# Patient Record
Sex: MALE | Race: WHITE | Employment: STUDENT | URBAN - METROPOLITAN AREA
[De-identification: names, ages, dates, MRNs, and addresses within clinical notes are randomized per-mention and may not be internally consistent; named-entity substitution may affect disease eponyms.]

---

## 2021-05-31 ENCOUNTER — HOSPITAL ENCOUNTER (OUTPATIENT)
Age: 21
Discharge: HOME | End: 2021-05-31
Payer: COMMERCIAL

## 2021-05-31 VITALS
SYSTOLIC BLOOD PRESSURE: 121 MMHG | WEIGHT: 170 LBS | RESPIRATION RATE: 16 BRPM | BODY MASS INDEX: 23.03 KG/M2 | HEART RATE: 59 BPM | HEIGHT: 72 IN | DIASTOLIC BLOOD PRESSURE: 71 MMHG | OXYGEN SATURATION: 97 % | TEMPERATURE: 98 F

## 2021-05-31 DIAGNOSIS — J30.2 SEASONAL ALLERGIES: ICD-10-CM

## 2021-05-31 DIAGNOSIS — J02.9 PHARYNGITIS, UNSPECIFIED ETIOLOGY: Primary | ICD-10-CM

## 2021-05-31 PROCEDURE — 87081 CULTURE SCREEN ONLY: CPT | Performed by: PHYSICIAN ASSISTANT

## 2021-05-31 PROCEDURE — 99203 OFFICE O/P NEW LOW 30 MIN: CPT

## 2021-05-31 PROCEDURE — 87880 STREP A ASSAY W/OPTIC: CPT | Performed by: PHYSICIAN ASSISTANT

## 2021-05-31 PROCEDURE — 99204 OFFICE O/P NEW MOD 45 MIN: CPT

## 2021-05-31 NOTE — ED PROVIDER NOTES
Patient Seen in: [facility]      History   Patient presents with:  Sore Throat    Stated Complaint: sore throat    HPI/Subjective:   HIWOT    Crow Fontenot is a 25-year-old male who presents today for evaluation of sore throat.   He has a past medica mucous membranes are normal. No trismus in the jaw. Posterior pharynx moderate erythema and clear postnasal drip with cobblestoning noted. 1+ symmetric tonsillar hypertrophy without exudate or abscess. Neck: Normal range of motion.    Cardiovascular: Norm concerning symptoms arise. Additional verbal discharge instructions are given and discussed. Discharge medications are discussed. The patient is in good condition throughout the visit today and remains so upon discharge.   I discuss the plan of care with ervin

## 2024-01-08 PROBLEM — K64.8 INTERNAL HEMORRHOID: Status: ACTIVE | Noted: 2023-09-07

## 2024-02-02 PROBLEM — K92.1 HEMATOCHEZIA: Status: ACTIVE | Noted: 2024-02-02

## 2024-02-02 PROBLEM — Z12.11 SPECIAL SCREENING FOR MALIGNANT NEOPLASM OF COLON: Status: ACTIVE | Noted: 2024-02-02

## 2024-07-03 LAB — AMB EXT HGBA1C: 8.7 %

## 2024-07-12 LAB
AMB EXT ANION GAP: 9
AMB EXT BILIRUBIN, TOTAL: 0.8 MG/DL
AMB EXT BUN: 15 MG/DL
AMB EXT CALCIUM: 9.9
AMB EXT CARBON DIOXIDE: 28
AMB EXT CHLORIDE: 98
AMB EXT CMP ALT: 25 U/L
AMB EXT CMP AST: 12 U/L
AMB EXT CREATININE: 0.96 MG/DL
AMB EXT POSTASSIUM: 4.6 MMOL/L
AMB EXT SODIUM: 135 MMOL/L
AMB EXT TOTAL PROTEIN: 7.9
EGFR: >90

## 2024-07-15 ENCOUNTER — APPOINTMENT (OUTPATIENT)
Dept: CT IMAGING | Age: 24
End: 2024-07-15
Attending: NURSE PRACTITIONER
Payer: COMMERCIAL

## 2024-07-15 ENCOUNTER — HOSPITAL ENCOUNTER (OUTPATIENT)
Dept: CT IMAGING | Age: 24
Discharge: HOME | End: 2024-07-15
Attending: NURSE PRACTITIONER
Payer: COMMERCIAL

## 2024-07-15 DIAGNOSIS — R10.9 ACUTE ABDOMINAL PAIN: ICD-10-CM

## 2024-07-15 DIAGNOSIS — R19.4 CHANGE IN BOWEL HABITS: ICD-10-CM

## 2024-07-15 LAB
CREAT BLD-MCNC: 1.1 MG/DL
EGFRCR SERPLBLD CKD-EPI 2021: 96 ML/MIN/1.73M2 (ref 60–?)

## 2024-07-15 PROCEDURE — 82565 ASSAY OF CREATININE: CPT

## 2024-07-15 PROCEDURE — 74177 CT ABD & PELVIS W/CONTRAST: CPT | Performed by: NURSE PRACTITIONER

## 2024-08-07 ENCOUNTER — TELEPHONE (OUTPATIENT)
Dept: ENDOCRINOLOGY CLINIC | Facility: CLINIC | Age: 24
End: 2024-08-07

## 2024-08-07 NOTE — TELEPHONE ENCOUNTER
7/24/2024 left voice mail to schedule referral received for education for Jeannie Samson .  8/7/2024 left voice mail to schedule referral received for education for Jeannie Samson call office at [phone]

## 2024-08-19 ENCOUNTER — DIABETIC EDUCATION (OUTPATIENT)
Dept: ENDOCRINOLOGY CLINIC | Facility: CLINIC | Age: 24
End: 2024-08-19
Payer: COMMERCIAL

## 2024-08-19 DIAGNOSIS — E13.9 DIABETES MELLITUS TYPE 1.5, MANAGED AS TYPE 2 (HCC): Primary | ICD-10-CM

## 2024-08-19 NOTE — PROGRESS NOTES
Masoud Astorga   12/10/2000 attended Step 1 Diabetes Education.    Referring Provider: Jeannie Samson    Date: 8/19/2024  Start time: 12:50 PM End time: 2:00 PM  _______________________________________________     Mr. Astorga is a 23 year old male who presents today with his mother for new onset diabetes education. They report is has been a stressful last two months. His father passed away from pancreatic cancer right before diagnosis of diabetes.     He initially attributed symptoms to stress/situational. Labs showed BG in 300s. Jeannie MORALES started him slowly with treatment. At first, just monitoring food for about 1 week, then started on 500mg daily of Metformin. He is now on 1,000mg Metformin at night and a Briana 2. He was on Briana 3 but was more expensive.    He has been trying to do lower or less carbohydrates than before, limiting sugar intake, less alcohol.    [place] and [place].       Assessment:     Wt Readings from Last 6 Encounters:   04/08/24 183 lb   01/08/24 180 lb 12.8 oz   05/31/21 170 lb       A1C on 07/03/2024 -- 8.7%    C-Peptide level on 07/03/2024 -- 3.02 ng/mL    Insulin level on 07/12/2024 -- 5.0 µIU/mL       Diabetes Overview, pathophysiology, A1C results and treatment options for diabetes self-management:   Described basic process and treatment options for diabetes self-management.    Blood Glucose Levels:     He was previously using a Briana 3, now currently using Briana 2. Streaming set up with clinic.      Report Period: 08/06/2024 - 08/19/2024 (14 days)  Generated: 08/19/2024  % Time CGM Active: 64%      Glucose Statistics and Targets  Average Glucose: 135 mg/dL  Glucose Management Indicator (GMI): 6.5%  Glucose Variability (%CV): 23.9%  Target Range: 70 - 180 mg/dL      Time in Ranges  Very High: >250 mg/dL --- 0%  High: 181 - 250 mg/dL --- 9%  Target Range: 70 - 180 mg/dL --- 91%  Low: 54 - 69 mg/dL --- 0%  Very Low: <54 mg/dL --- 0%    We discussed how to watch  different activities and foods and notice impact on Briana sensor.     We discussed s/s of high and low blood sugar levels. He reports having many hyperglycemic symptoms in the last few months without realizing that is what was happening.      Healthy Habits:     Obtained diet history from yesterday:     TYPICAL  FOOD  NOTES   Breakfast  4 eggs with 1/4 cup onion, 1/2 cup mixed greens. Coffee black (sometimes will do sugar free syrup or a latte with nondairy milk)    (Estimated 0 grams of carbs)   Lunch  Steak salad - case cheese, tomatoes, onion, balsamic tim., salad greens, very small amount of fried onion strings, water    (Estimated less than 15 grams of carbs)   Dinner  Chicken tacos x2 with stir fried onions, bell peppers, and salsa on carb balance tortilla and tortilla chips on the side.    (Estimated 26 grams of carbs for tacos, unsure on quantity of chips)   Snacks  After dinner: organic greek yogurt with frozen blueberries, small amount of keto cereal, raw honey   Fairlife core power   Almonds (flavored as chip alternative)       Beverages  Water, fairlife, coffee, occasionally zero Dr. Nickerson             Instructed on basic diet guidelines including aiming for 3 meals/day, balancing each meal with variety of nutrients, using plate method as a model for meals - goal of 1/2 plate non-starchy vegetables, 1/4 plate lean proteins, 1/4 plate carbohydrates and modest portions of fruit, yogurt, milk if desired. We reviewed label reading. We discussed doing 2-3 meals per day, 30-60 grams of carbohydrate per meal with 1-2 snacks per day, 15 grams of carbohydrate per snack.    Being Active: Discussed benefits of regular Physical Activity and goal to complete 30 minutes daily. Currently does running, is trying to get back into weight lifting. We discussed safety with low blood sugars and exercising. We discussed watching sensor during/after workouts.      Medications:     Taking Medications: Reviewed current diabetes  medications: Metformin. Denies SE.  Diabetes Medication:     Oral:  Metformin 1,000mg daily (in evening)    Goals:     Problem Solving: Prevention, detection, and treatment of acute complications: taught symptoms of hypoglycemia, hyperglycemia, how to treat low blood sugar (Rule of 15), and actions for lowering high blood glucose levels.    Patient set diabetes self-management goals: get a better idea of balanced diet and make a plan.     Recommendations:     He has an appointment scheduled to see an eye doctor to complete a dilated eye exam.    Start monitoring blood glucose and keeping food log and attend Diabetes Step 2 Class.    Patient is willing to make lifestyle changes to improve blood glucose management.    They are willing to see Diabetes NP for further treatment of diabetes. Appointment scheduled.    Emailed/written materials provided for all areas covered. Diabetes 101 handout provided and reviewed.    Patient and mother verbalized understanding and has no further questions at this time.    Follow-up plan:   Future Appointments   Date Time Provider Department Center   8/20/2024  5:30 PM [facility]   8/29/2024 10:15 AM Jinny Frost APRN [facility]   9/3/2024  5:30 PM [facility]   9/17/2024  5:30 PM [facility]   10/1/2024  5:30 PM [facility]        Patient to contact diabetes center for questions/concerns.    Claudia MARTINES, [facility]

## 2024-08-19 NOTE — PATIENT INSTRUCTIONS
Medications:    Metformin as prescribed. This can cause GI symptoms.    Healthy Eating:    Continue to use the Plate Method as a model for your meals:  1/2 of your plate is non-starchy vegetables, 1/4 of your plate is lean protein, 1/4 of your plate is starchy or carbohydrate foods    Try to measure your foods for a week or so to be able to identify how much you are eating. Carb counting apps can be helpful with this.    Pair a carbohydrate with protein, fat, and fiber foods to help stabilize blood sugar.     Monitoring:     Continue with your Briana sensor.    If not using a sensor, you should be checking at least 1x per day.    Check your blood sugars 1 time per day. Switch off when you check between these two times:  1.) Fasting (before you eat breakfast)  2.) 2 hours after you start eating a meal (vary the meal you test after between breakfast, lunch, and dinner)    ADA (American Diabetes Association):  A1c:  7% or less  Fasting Blood Glucose (before you eat breakfast):    2 Hours After a Meal:  Less than 180    Physical Activity:    Try to add in walks after meals. This can help with glucose levels especially after a carbohydrate heavy meal.    There are studies that show that even 20 minutes of exercise can positively impact glucose levels for up to 72 hours after!    Coping:    Find a support group on Beyond Type 1 or Beyond Type 2 to find people just like you.    Talk to your family or friends.    Meditate or do yoga. Go for a walk to clear your mind.

## 2024-08-28 ENCOUNTER — TELEPHONE (OUTPATIENT)
Dept: ENDOCRINOLOGY CLINIC | Facility: CLINIC | Age: 24
End: 2024-08-28

## 2024-08-28 NOTE — TELEPHONE ENCOUNTER
Abstracted labs from 7/12/2024        Creatinine  0.60 - 1.30 mg/dL 0.96   eGFRcr (CKD-EPI 2021)  >=60 mL/min/1.73 m² >90     Sodium  133 - 146 mmol/L 135   Potassium  3.5 - 5.1 mmol/L 4.6   Chloride  98 - 107 mmol/L 98   Carbon Dioxide  21 - 31 mmol/L 28   Blood Urea Nitrogen  7 - 25 mg/dL 15     BUN/Creatinine Ratio  10.0 - 22.0 15.6   Calcium  8.3 - 10.5 mg/dL 9.9   Glucose  70 - 100 mg/dL 202 High    Protein, Total  6.4 - 8.3 g/dL 7.9   Albumin  3.5 - 5.0 g/dL 4.7   ALT  11 - 51 units/L 25   Alkaline Phosphatase  34 - 104 units/L 77   AST  13 - 39 units/L 12 Low    Bilirubin, Total  0.2 - 1.2 mg/dL 0.8   Globulin  2.3 - 3.5 g/dL 3.2   Albumin/Globulin Ratio  1.0 - 2.0 g/dL 1.5   Anion Gap  4 - 13 mmol/L 9

## 2024-08-28 NOTE — PROGRESS NOTES
Due to COVID-19 ACTION PLAN, the patient's office visit was converted to a video visit. Please note that the following visit was completed using two-way, real-time interactive audio and video communication.  Time Spent:  34 min    Masoud Astorga is a 23 year old presenting today to establish for new onset diabetes management.   Primary care physician: Jose Canchola MD/Jeannie Samson NP  Most recent  A1C 8.7% 7-   Prior to labs w PCP, last  was having polys, weight loss , + fatigue but pt attributed to not working out and not eating as healthy   Last August 2023, pt had blood test and CMP glucose reading was 160 mg/dl (believes it was non fasting)   Also was getting sick a lot - under a lot of stress (father diagnosed with pancreatic cancer and he passed away 7-2024) ; mentally fatigued       Diagnosed ~7-2024    Family history: mother w pre DM , maternal uncle with diabetes (? 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 Works and now resides in [place])     Started on briana 3 cgm w PCP  office but was told insurance would not cover briana 3 - but patient is paying cash voucher price - has briana 2 but would prefer smaller size and continuous reading w briana 3      Started on metformin - tolerating well .   Since diagnosis, he is eating out less. Making own meals/ eating clean, low carb   Is attending Diabetes classes      Feeling more energy, focus       Briana 3 download: 7.31.2024 thru 8.27.2024   (74% active sensor)     Coefficient of variation: 24.2   GMI: estimated A1C 6.7     Average glucose: 141 mg/dl     87% In target range(70-180mg/dl)     13%High glucose targets (> 180mg/dl) :     0%Very high glucose targets:  (> 250mg/dl)     0%Low glucose targets:(less than 70mg/dl)     0%Very Low glucose targets: (< 54mg/dl ) :     Findings:   Hypoglycemia: NONE, low risk      Time in target: 87 %  (ADA  recommended target > 70%)         Diabetes History:  7-2024 Diabetes   Patient has not had hospitalizations for blood sugar issues  denies any history of pancreatitis      Previous DM therapies:  None     Current DM Regimen:  lis 3 CGM   Metformin 500mg 2 tab daily at dinner          HGBA1C:    Lab Results   Component Value Date    A1C 8.7 07/03/2024       No results found for: \"CHOLEST\", \"TRIG\", \"HDL\", \"LDL\"  No results found for: \"MICROALBCREA\"   Lab Results   Component Value Date    CREATSERUM 0.96 07/12/2024    EGFRCR 96 07/15/2024     Lab Results   Component Value Date    AST 12 07/12/2024    ALT 25 07/12/2024       No results found for: \"TSH\", \"T4F\"    7-3-2024 labs   Cpeptide 3.02   A1C 8.7%   Creatinine 0.96   Egfr > 90   AST 12   ALT 25      DM Complications:  Microvascular:   Neuropathy: no  Retinopathy: not screened   Nephropathy: not screened     Macrovascular:  PVD: no  CAD: no  Stroke/CVA: no        Modifying factors:  Medication adherence: yes    NEW DM dx   Recent steroids, illness or infections ( past 3m): NO     Allergies: Other    Past Medical History:    Blood in the stool    No longer an issue    Frequent use of laxatives    No longer taking    Hemorrhoids    No longer an issue    Seasonal allergies     History reviewed. No pertinent surgical history.  Social History     Socioeconomic History    Marital status: Single   Tobacco Use    Smoking status: Some Days    Smokeless tobacco: Never   Vaping Use    Vaping status: Some Days   Substance and Sexual Activity    Alcohol use: Yes     Alcohol/week: 4.0 standard drinks of alcohol     Types: 4 Standard drinks or equivalent per week     Comment: social    Drug use: Yes     Types: Cannabis     Social Determinants of Health     Financial Resource Strain: Low Risk  (9/7/2023)    [facility]    Overall Financial Resource Strain (CARDIA)     Difficulty of Paying Living Expenses:  Not hard at all   Food Insecurity: No Food Insecurity (9/7/2023)    [facility]    Hunger Vital Sign     Worried About Running Out of Food in the Last Year: Never true     Ran Out of Food in the Last Year: Never true   Transportation Needs: No Transportation Needs (9/7/2023)    Received [facility]    PRAPARE - Transportation     Lack of Transportation (Medical): No     Lack of Transportation (Non-Medical): No   Housing Stability: Low Risk  (9/7/2023)    Received from [facility]    Housing Stability Vital Sign     Unable to Pay for Housing in the Last Year: No     Number of Places Lived in the Last Year: 1     Unstable Housing in the Last Year: No     Family History   Problem Relation Age of Onset    Cancer Father 67        pancreatic cancer    Diabetes Mother         pre DM    No Known Problems Brother     Diabetes Maternal Uncle      Current Medication List:   Current Outpatient Medications   Medication Sig Dispense Refill    Continuous Glucose Sensor (FREESTYLE VALERIY 3 SENSOR) Does not apply Misc 1 Device every 14 (fourteen) days. 2 each 5    metFORMIN 500 MG Oral Tab Take 2 tablets (1,000 mg total) by mouth 2 (two) times daily with meals.      Multiple Vitamins-Minerals (MULTI-VITAMIN/MINERALS) Oral Tab Take 1 tablet by mouth daily.             DM associated review of  symptoms:   Endocrine: Polyuria, polyphagia, polydipsia: no  Neurological: Paresthesias: no  HEENT: Blurred vision: no  Skin: no rash or wounds  Hematological: Hypoglycemia: no      Review of Systems     LUNGS: denies shortness of breath   CARDIOVASCULAR: denies chest pain  GI: denies abdominal pain, nausea or diarrhea   : denies dysuria  MUSCULOSKELETAL: denies pain        Physical exam:  There were no vitals taken for this visit.  There is no height  or weight on file to calculate BMI.    Physical Exam     Constitutional: Normal appearance   Cardiovascular: Not assessed    Pulmonary/Chest: Effort normal  Neurological: Alert and oriented .   Psychiatric: Normal mood and affect.       Assessment/Plan:      Diabetes mellitus, new onset (HCC)  A1C: 8.7%   GMI on 28 day lis 6.7%   Self reported Weight: 183 lb (6'0 BMI = 24.8)    Diabetes control is improving- needs ongoing management .  Recommendations:   Due to age, low BMI would recommend T1 screening     Check T1DM antibodies: islet cell , znT8 transporter and MICHAEL 65 antibodies - ordered for [facility]     For now   Continue   Metformin 500m tab daily at dinner (consider XR w next refill)  Change lis 2 to lis 3 cgm since paying cash voucher price - if issues at pharmacy, pt to contact DM center       Reviewed with patient health impact associated with high glucose trends and the importance of better glucose control to prevent onset /progression of DM complications.   Reviewed w patient pathophysiology of diabetes, clinical significance of A1c, adverse effects of suboptimal glucose control, and goals of therapy   Reviewed the A1C test, what the value reflects and the goal for the patient.   Reminded pt on A1C and blood sugar targets (Fasting < 130 and post prandial <180 ) and complications associated with hyperglycemia and uncontrolled DM (on AVS)   Continue with lifestyle modifications since they have positive impact on diabetes/blood sugars/health (portion control, physical activity)   Reinforced timing and adherence with medication, self-monitoring of blood glucose and routine follow up    Continue w classes offered at Diabetes center to review carb goals, food label reading, and offer further support/guidance with exercise planning and  diabetes dx     The patient is asked to return in   but recommended to contact DM clinic sooner if questions or concerns.    The patient indicates understanding of  these issues and agrees to the plan.      Orders Placed This Encounter    MICHAEL-65 Autoantibody    ISLET CELL ANTIBODY SCREEN WITH REFLEX TO TITER     Order Specific Question:   Release to patient     Answer:   Immediate    Zinc Transporter 8 Antibody     Order Specific Question:   Release to patient     Answer:   Immediate    Microalb/Creat Ratio, Random Urine    TSH W Reflex To Free T4     Order Specific Question:   Release to patient     Answer:   Immediate    Continuous Glucose Sensor (FREESTYLE VALERIY 3 SENSOR) Does not apply Misc     Si Device every 14 (fourteen) days.     Dispense:  2 each     Refill:  5     Use the valeriy voucher since patient is paying cash     Diabetes complications & risks surveillance:     A1C/Blood pressure: as reported     Nephropathy screening: not screened; new dx. No indication for  ace /arb rx.   LIPID screening: not screened. No indication for  statin rx.   Last dilated eye exam: No data recorded Exam shows retinopathy? No data recorded  Last diabetic foot exam: No data recorded        Note to patient: The  Cures Act makes medical notes like these available to patients in the interest of transparency. However, be advised this is a medical document. It is intended as peer to peer communication. It is written in medical language and may contain abbreviations or verbiage that are unfamiliar. It may appear blunt or direct. Medical documents are intended to carry relevant information, facts as evident, and the clinical opinion of the practitioner.       Spent 45 min obtaining patient history, evaluating patient, reviewing blood glucose trends, discussing treatment options, lifestyle modifications and completing documentation -this time does not including sensor interpretation time   The risks and benefits of my recommendations, as well as other treatment options were discussed with the patient today. questions were also answered to the best of my knowledge.         This has  been done in good luis to provide continuity of care in the best interest of the provider-patient relationship, due to the on-going public health crisis/national emergency and because of restrictions of visitation.  There are limitations of this visit as no or only very limited physical exam could be performed.  Every conscious effort was taken to allow for sufficient and adequate time.  This billing visit was spent on reviewing blood sugar trends, DM related labs, medications and decision making.  Appropriate medical decision-making and tests are ordered as detailed in the plan of care above.      Masoud Astorga understands phone or video evaluation is not a substitute for face-to-face examination or emergency care. Patient advised to go to ER or call 911 for worsening symptoms or acute distress.     Jinny Frost, APRN

## 2024-08-29 ENCOUNTER — TELEPHONE (OUTPATIENT)
Dept: ENDOCRINOLOGY CLINIC | Facility: CLINIC | Age: 24
End: 2024-08-29

## 2024-08-29 ENCOUNTER — TELEMEDICINE (OUTPATIENT)
Dept: ENDOCRINOLOGY CLINIC | Facility: CLINIC | Age: 24
End: 2024-08-29
Payer: COMMERCIAL

## 2024-08-29 DIAGNOSIS — E13.9 DIABETES MELLITUS TYPE 1.5, MANAGED AS TYPE 2 (HCC): ICD-10-CM

## 2024-08-29 DIAGNOSIS — E11.9 DIABETES MELLITUS, NEW ONSET (HCC): Primary | ICD-10-CM

## 2024-08-29 PROCEDURE — 99215 OFFICE O/P EST HI 40 MIN: CPT | Performed by: NURSE PRACTITIONER

## 2024-08-29 PROCEDURE — 95251 CONT GLUC MNTR ANALYSIS I&R: CPT | Performed by: NURSE PRACTITIONER

## 2024-08-29 RX ORDER — BLOOD-GLUCOSE SENSOR
1 EACH MISCELLANEOUS
Qty: 2 EACH | Refills: 5 | Status: SHIPPED | OUTPATIENT
Start: 2024-08-29 | End: 2024-08-29 | Stop reason: ALTCHOICE

## 2024-08-29 RX ORDER — BLOOD-GLUCOSE SENSOR
1 EACH MISCELLANEOUS AS DIRECTED
Qty: 2 EACH | Refills: 3 | Status: SHIPPED | OUTPATIENT
Start: 2024-08-29 | End: 2024-09-28

## 2024-08-29 NOTE — ASSESSMENT & PLAN NOTE
Reviewed with patient health impact associated with high glucose trends and the importance of better glucose control to prevent onset /progression of DM complications.

## 2024-08-29 NOTE — TELEPHONE ENCOUNTER
Left message for pharmacy to please use Briana voucher for the 3+ sensors, should come out to $75 for two sensors.  Please call office with any issues.

## 2024-08-29 NOTE — PATIENT INSTRUCTIONS
I am going to order antibodies to see if you have type 1 or 1.5 Diabetes   Please update the [facility] at [facility] (pick your location) but verify if they require an appointment- each location varies        No need to fast for these labs (blood and urine)   The cpeptide only tells us how active your insulin making cells (beta cells) in the pancreas are -     Your current trends are doing well -   87% in healthy targets on recent targets   Estimated A1C 6.7% based on past 28 days     Continue metformin 500m tab daily at dinner daily     The lis 2 and lis 3 both offer the $70-80 voucher at the pharmacy - you should be able to resume lis 3   Contact our office if pharmacy tells you otherwise         American Diabetes Association: blood sugar targets:     Fasting blood sugar (before breakfast) Target:    (ideally less than 110)  2 hours after eating less than 180 (ideally less than  150 )     Call for blood sugars less than  75 or greater than  200 more than 2 times in a week     If you are wearing the sensor, please look at your trends/averages    Recommendations:   GMI (estimated A1C ) target under  7%     Time in range (healthy blood sugar targets) : goal is over 70%   Over 180 : goal is less than 20 %   Over 250: goal is  less than 5%

## 2024-09-03 ENCOUNTER — NURSE ONLY (OUTPATIENT)
Dept: ENDOCRINOLOGY CLINIC | Facility: CLINIC | Age: 24
End: 2024-09-03
Payer: COMMERCIAL

## 2024-09-03 DIAGNOSIS — E13.9 DIABETES MELLITUS TYPE 1.5, MANAGED AS TYPE 2 (HCC): Primary | ICD-10-CM

## 2024-09-03 PROCEDURE — G0109 DIAB MANAGE TRN IND/GROUP: HCPCS

## 2024-09-03 NOTE — PROGRESS NOTES
Masoud Astorga : 12/10/2000 attended Step 3 Group Diabetes Education Class: Food Groups, Carbohydrate Counting, Label Reading    9/3/2024   Referring Provider: Jeannie MORALES    Start time: 5:30 PM End time: 6:30 PM    The patient participated during the class: Pt was able to identify sources of carbohydrates, read food labels for carb content of foods.       Healthy Eating:  Reviewed Basic Diet Guidelines as foundation of diabetes meal planning. Reinforced the balanced plate method. Taught nutrition basics defining carbohydrate, protein, and fat. Taught label reading, including an option to count carbohydrate grams or servings. Provided patient with goals for carbohydrate grams/choices for meals and snacks. Discussed sugar substitutes, alcohol and effect on blood glucose. Luis's helpful for smart phones, as well as websites for examining carbohydrate levels provided. Reviewed and reinforced macronutrient's, carbohydrate counting and meal planning.    Problem Solving:  Reinforced signs, symptoms, and treatment of hypoglycemia using the Rule of 15.  Importance of being aware of potential for a low blood sugar when consuming alcohol  Discussed impact of different food items and portion sizes on blood glucose levels.  Discussed blood glucose target of 180 mg/dL, if testing 2 hours post meal.    Monitoring:  Reviewed blood glucose records and importance of tracking foods/blood glucose levels.    Behavior Change:  Reviewed and updated individual goals and action plan set by patient.   Addressed barriers to tracking foods/blood glucose levels and following guidelines presented/achieving goals, and setting SMART goals as a group discussion.    Recommendations:  Follow individual meal plan recommended by Educator (GIOVANNI).  Continue implementing individual goals.   Attend remaining class sessions.  Begin implementing carbohydrate counting and continue dietary and blood glucose tracking.     Written materials provided  for all areas covered.    Patient verbalized understanding and has no further questions currently.      Claudia Rodriguez, MERCEDESN-RN, [facility]

## 2024-09-17 ENCOUNTER — NURSE ONLY (OUTPATIENT)
Dept: ENDOCRINOLOGY CLINIC | Facility: CLINIC | Age: 24
End: 2024-09-17
Payer: COMMERCIAL

## 2024-09-17 DIAGNOSIS — E13.9 DIABETES MELLITUS TYPE 1.5, MANAGED AS TYPE 2 (HCC): Primary | ICD-10-CM

## 2024-09-17 PROCEDURE — G0109 DIAB MANAGE TRN IND/GROUP: HCPCS

## 2024-09-18 NOTE — PROGRESS NOTES
Masoud Astorga 12/10/2000  attended Step 4 Group Class: Reducing Complications, Stress Management, Disaster Planning, & Special Occasions   Date: 9/17/2024          Referring Provider:  Jeannie MORALES     Start time: 5:30 PM      End time: 6:30 PM     The patient participated during the class: Patient was able to identify ways to reduce risks for micro and macro-vascular complications of diabetes.      Healthy Eating  Discuss tips for dining out, holidays and special occasion eating.  Reviewed carbohydrate counting and balancing meals.     Being Active  Reinforce the importance of regular physical activity.     Monitoring  Discuss/answer questions about blood glucose trends.     Taking Medication   Reinforce the importance of taking diabetes medications as prescribed.     Problem Solving  Discuss disaster preparedness and planning to travel safely with diabetes.  Discussed importance of medic alert identification.     Reducing Risks  Discuss potential complications of uncontrolled diabetes and how to minimize risk including eye, foot, dental, and skin care.   Renal and cardiovascular monitoring discussed including target goals and signs/symptoms of MI and CVA.   Discuss immunizations recommended for diabetes.    Healthy Coping  Discuss support plan, stress reduction, and diabetes distress.  Discussed coping strategies and how to apply.  Reviewed when to ask for extra help with coping.      The patient verbalized understanding and has no further questions at this time.  Written materials provided for all areas covered.  Recommendation: attend Step 5 Class.    Claudia MIRZA-HARMAN, [facility]

## 2024-10-01 ENCOUNTER — NURSE ONLY (OUTPATIENT)
Age: 24
End: 2024-10-01
Payer: COMMERCIAL

## 2024-10-01 DIAGNOSIS — E13.9 DIABETES MELLITUS TYPE 1.5, MANAGED AS TYPE 2 (HCC): Primary | ICD-10-CM

## 2024-10-01 PROCEDURE — G0109 DIAB MANAGE TRN IND/GROUP: HCPCS

## 2024-10-02 NOTE — PROGRESS NOTES
Masoud Astorga : 12/10/2000 attended Step 5 Group Class: Heart Healthy Eating and Exercise    Date: 10/1/2024           Referring Provider: Jeannie Samson Start time: 5:30 PM       End time: 6:30 PM     The patient participated during the class: Patient verbalized dietary changes recommended to reduce saturated fat and sodium and to increase dietary fiber.      Reviewed complications, including DKA (diabetic ketoacidosis) and HHNS (hyperosmolar, hyperglycemic non-ketotic syndrome).    Healthy Eating  Discussed heart healthy diet (types of fat, cholesterol, fiber and sodium)   Mediterranean and DASH diets including sample meal plans   Discussed reading nutrition facts labels, nutrition claims  Discussed challenges at the grocery store and when eating out and options                       Being Active  Discussed benefits and precautions of regular physical activity (aerobic exercise and strength training).     Monitoring  Discussed blood pressure goals.  Discussed lipid levels and goals.    Behavior Change  Reviewed and updated individual goals and action plan set by patient.     Problem Solving  Reviewed progress on overall goals to-date  Addressed barriers to setting and following guidelines presented/achieving goals, as a group discussion.     Recommendations:   Work toward limiting saturated fat and sodium and increasing fiber in diet.  Work toward getting 30 minutes of aerobic activity up to 5 or more days/week.  Continue implementing individual goals.  Follow up through MNT or DSMT as needed.  Complete Diabetes Continued Care/ADA Recommended Screenings (labs, eye exam, foot exam, dental exam, vaccines).    Written materials provided for all areas covered.    Claudia LONGN-RN, [facility] [place] [facility] [place]

## 2024-10-17 ENCOUNTER — TELEPHONE (OUTPATIENT)
Age: 24
End: 2024-10-17

## 2024-10-17 LAB — AMB EXT GMI: 6.7 %

## 2024-10-17 NOTE — TELEPHONE ENCOUNTER
Reviewed  lis    28 day GMI 6.7%     Lab Results   Component Value Date    GMI 6.7 10/17/2024          Last A1c value was 8.7% done 7/3/2024.  Name: Masoud Astorga  YOB: 2000  Report Period: 09/18/2024 - 10/15/2024 (28 days)  Generated: 10/17/2024  % Time CGM Active: 92%      Glucose Statistics and Targets  Average Glucose: 143 mg/dL  Glucose Management Indicator (GMI): 6.7%  Glucose Variability (%CV): 27.8%  Target Range: 70 - 180 mg/dL      Time in Ranges  Very High: >250 mg/dL --- 2%  High: 181 - 250 mg/dL --- 13%  Target Range: 70 - 180 mg/dL --- 85%  Low: 54 - 69 mg/dL --- 0%  Very Low: <54 mg/dL --- 0%

## 2024-11-22 ENCOUNTER — TELEPHONE (OUTPATIENT)
Age: 24
End: 2024-11-22

## 2024-11-25 NOTE — TELEPHONE ENCOUNTER
Still need to call and r/s appt     Future Appointments   Date Time Provider Department Center   12/5/2024  1:15 PM Jinny Frost APRN [facility]

## 2025-01-03 ENCOUNTER — TELEPHONE (OUTPATIENT)
Age: 25
End: 2025-01-03

## 2025-01-03 NOTE — TELEPHONE ENCOUNTER
Received fax from pharmacy that lis 3+ sensors are needing a PA - per CB's last OV note 8/29/24:    Change lis 2 to lis 3 cgm since paying cash voucher price - if issues at pharmacy, pt to contact DM center     Will disregard PA request

## 2025-01-23 ENCOUNTER — TELEPHONE (OUTPATIENT)
Facility: CLINIC | Age: 25
End: 2025-01-23

## 2025-01-23 NOTE — TELEPHONE ENCOUNTER
Labs still active from 8/29/24 pt can use to get done for VV coming up with CB called pt to remind LVMTCB and sending MCM   Future Appointments   Date Time Provider Department Center   2/6/2025  1:15 PM Jinny Frost APRN [facility]

## 2025-03-05 LAB — AMB EXT TSH: 1.63 MIU/ML

## 2025-03-10 LAB
CREATININE, RANDOM URINE: 247 MG/DL (ref 20–320)
ISLET CELL ANTIBODY SCREEN: NEGATIVE
MICROALBUMIN/CREATININE RATIO, RANDOM URINE: 4 MG/G CREAT
MICROALBUMIN: 1.1 MG/DL

## 2025-03-11 ENCOUNTER — TELEPHONE (OUTPATIENT)
Dept: ENDOCRINOLOGY CLINIC | Facility: CLINIC | Age: 25
End: 2025-03-11

## 2025-03-11 ENCOUNTER — TELEPHONE (OUTPATIENT)
Facility: CLINIC | Age: 25
End: 2025-03-11

## 2025-03-11 NOTE — TELEPHONE ENCOUNTER
Checked [facility] online, it does not appear all labs were drawn.  Contacted [facility] @ [phone], account #[id number].  TSH, MICHAEL-65, and Zinc transporter are currently running (zinc has 8 day turnaround as it's a send out).  She will fax TSH and MICHAEL-65 result today. The Hemoglobin A1C was not run and they do not have a lavender top to run it.

## 2025-03-11 NOTE — TELEPHONE ENCOUNTER
Recent labs at [facility]    Only urine m/alb and Islet cell antibody resulted  Can we confirm if all ordered labs were done   A1C, CMP, TSH , MICHAEL 65 and Zinc transporter antibody tests were also ordered

## 2025-03-11 NOTE — TELEPHONE ENCOUNTER
Called [facility] and spoke to rep informed what was happening A1C order was never done for patient- they will need patient to call back to get lavender tub done.     Spoke to manager  Lexi brink [phone] about patient needing to get reached out too get lab order done since it was not our fault. Unable to leave voice mail on [facility]  recalled and same thing.     Called patient to inform he needs to get A1C done before appt -thy did not draw lab for lavender tub so they can not run order. LVM for patient and sending MCM   Future Appointments   Date Time Provider Department Center   3/27/2025 11:00 AM Jinny Frost APRN [facility]

## 2025-03-11 NOTE — TELEPHONE ENCOUNTER
We need to call patient and inform him [facility] did not run all the tests as ordered     Confirmed ALL  labs were ordered for [facility]

## 2025-03-11 NOTE — TELEPHONE ENCOUNTER
Fax received from [facility] with patients lab results from 3/5/25    Only received one result    Abstracted and given to provider for review

## 2025-03-12 NOTE — TELEPHONE ENCOUNTER
Patient sent My Chart Message - patient dialed number and received error - asked about alterative phone number - patient went to [place] location- contact # [phone], customer service # [phone]

## 2025-03-13 LAB
GLUTAMIC ACID DECARBOXYLASE 65 AB: <5 IU/ML
TSH W/REFLEX TO FT4: 1.63 MIU/L (ref 0.4–4.5)
ZINC TRANSPORTER 8 (ZNT8) ANTIBODY: <10 U/ML

## 2025-03-26 ENCOUNTER — TELEPHONE (OUTPATIENT)
Dept: ENDOCRINOLOGY CLINIC | Facility: CLINIC | Age: 25
End: 2025-03-26

## 2025-03-26 NOTE — TELEPHONE ENCOUNTER
Called patient to remind of A1C needed  to be updated for CB video visit 04/03/25 -patient will need to be scheduled for nurse visit for A1C if they do not want to go to [facility].     LEFT VOICEMAIL TO CALL BACK

## 2025-03-31 NOTE — TELEPHONE ENCOUNTER
Called patient someone answered and then hung up on me. Recalled same thing. Patient did read MCM

## 2025-04-01 NOTE — TELEPHONE ENCOUNTER
Patient responded via My Chart Message - advised that he had missed previous calls - will see Primary Care Provider this Friday, where they will draw A1C in office - advised that he would re-schedule appointment - patient has re-scheduled as below:     Future Appointments   Date Time Provider Department Center   4/24/2025 11:30 AM Jinny Frost APRN [facility]

## 2025-04-30 NOTE — PROGRESS NOTES
Name: Masoud Astorga  YOB: 2000  Report Period: 04/03/2025 - 04/30/2025 (28 days)  Generated: 04/30/2025  Time CGM Active: 63%      Glucose Statistics and Targets  Average Glucose: 135 mg/dL  Glucose Management Indicator (GMI): 6.5%  Glucose Variability (%CV): 26.3%  Target Range: 70 - 180 mg/dL      Time in Ranges  Very High: >250 mg/dL --- 1%  High: 181 - 250 mg/dL --- 10%  Target Range: 70 - 180 mg/dL --- 89%  Low: 54 - 69 mg/dL --- 0%  Very Low: <54 mg/dL --- 0%

## 2025-05-01 ENCOUNTER — TELEMEDICINE (OUTPATIENT)
Facility: CLINIC | Age: 25
End: 2025-05-01
Payer: COMMERCIAL

## 2025-05-01 DIAGNOSIS — E11.9 TYPE 2 DIABETES MELLITUS WITHOUT COMPLICATION, WITHOUT LONG-TERM CURRENT USE OF INSULIN (HCC): Primary | ICD-10-CM

## 2025-05-01 RX ORDER — BLOOD-GLUCOSE SENSOR
1 EACH MISCELLANEOUS
COMMUNITY
Start: 2025-05-01

## 2025-05-01 NOTE — PATIENT INSTRUCTIONS
Your trends are really improved.     Please be sure you getting the calories/nutrients you need for your health   Meeting with dietitian is recommended.     Resume Diabetes care with PCP office but feel free to reconnect with me if any diabetes concerns or increase in trends     Continue Metformin daily   Fluctuations in blood sugars are best detected by testing your sugar with your meter.   In order for me to determine any patterns in your blood sugars, you will need to test your blood sugar 1- 2 times daily     It would be best to change up the times of day that you are testing your sugar.   Always test before breakfast (fasting) and then alternate testing blood sugar 1-2 hours after your meals.     American Diabetes Association: blood sugar targets:     Fasting blood sugar (before breakfast) Target:    (ideally less than 110)  2 hours after eating less than 180 (ideally less than  150 )     If you are wearing the sensor, please look at your trends/averages    Recommendations:   GMI (estimated A1C ) target under  7%     Time in range (healthy blood sugar targets) : goal is over 70%     Over 180 : goal is less than 20 %   Over 250: goal is  less than 5%     Be sure to have your Diabetes eye exam done annually